# Patient Record
Sex: MALE | Race: WHITE | Employment: FULL TIME | URBAN - METROPOLITAN AREA
[De-identification: names, ages, dates, MRNs, and addresses within clinical notes are randomized per-mention and may not be internally consistent; named-entity substitution may affect disease eponyms.]

---

## 2022-04-07 ENCOUNTER — OFFICE VISIT (OUTPATIENT)
Dept: FAMILY MEDICINE | Facility: CLINIC | Age: 42
End: 2022-04-07
Payer: COMMERCIAL

## 2022-04-07 VITALS
HEART RATE: 53 BPM | SYSTOLIC BLOOD PRESSURE: 130 MMHG | TEMPERATURE: 98.1 F | WEIGHT: 180 LBS | DIASTOLIC BLOOD PRESSURE: 80 MMHG | BODY MASS INDEX: 27.28 KG/M2 | OXYGEN SATURATION: 98 % | RESPIRATION RATE: 16 BRPM | HEIGHT: 68 IN

## 2022-04-07 DIAGNOSIS — M77.8 ELBOW TENDINITIS: ICD-10-CM

## 2022-04-07 DIAGNOSIS — M54.9 UPPER BACK PAIN: ICD-10-CM

## 2022-04-07 DIAGNOSIS — R91.8 PULMONARY NODULES: Primary | ICD-10-CM

## 2022-04-07 PROCEDURE — 99203 OFFICE O/P NEW LOW 30 MIN: CPT | Performed by: FAMILY MEDICINE

## 2022-04-07 RX ORDER — ATORVASTATIN CALCIUM 10 MG/1
10 TABLET, FILM COATED ORAL DAILY
COMMUNITY
Start: 2022-02-18

## 2022-04-07 NOTE — PROGRESS NOTES
"  Assessment & Plan     Pulmonary nodules  CT calcium supplement report reviewed he has 6 mm nodule.  Mostly in the low risk people with non-smoking conservative management is advised, however I advised him to talk to his primary in the next physical and get another CT scan to make sure this remains stable if they agree..  If remains stable then no further work-up is needed.  He agrees and will talk to his primary to follow up    Upper back pain  For back pain most likely muscular suggested ibuprofen, reassured it does not related to any nodule at this time.    Elbow tendinitis  About tenderness most likely overuse.  Suggested Ace wrap and avoiding activities aggravate the pain can take ibuprofen as needed.      BMI:   Estimated body mass index is 27.78 kg/m  as calculated from the following:    Height as of this encounter: 1.715 m (5' 7.5\").    Weight as of this encounter: 81.6 kg (180 lb).           No follow-ups on file.    Garland Carlisle MD  [facility] ELLIE Ramírez is a 41 year old who presents for the following health issues  History of Present Illness       Back Pain:  He presents for follow up of back pain. Patient's back pain is a recurring problem.  Location of back pain:  Right middle of back and right upper back  Description of back pain: cramping  Back pain spreads: nowhere    Since patient first noticed back pain, pain is: gradually improving  Does back pain interfere with his job:  Yes      He eats 2-3 servings of fruits and vegetables daily.He consumes 1 sweetened beverage(s) daily.He exercises with enough effort to increase his heart rate 20 to 29 minutes per day.  He exercises with enough effort to increase his heart rate 4 days per week. He is missing 1 dose(s) of medications per week.     Patient came today with multiple nonspecific complaints of discomfort in the different parts of the body especially in the elbow area also in the upper back in the " "paraspinous area.  He had evaluation done in the past with CT calcium score and he has a small 6 mm nodule he is non-smoker and he has been followed up with his primary about that.  Denies any other concerns no recent weight loss no night sweats or any other symptoms.  These symptoms of upper back pain has improved significantly however he continued to have some elbow discomfort.    Review of Systems   Constitutional, HEENT, cardiovascular, pulmonary, gi and gu systems are negative, except as otherwise noted.      Objective    /80 (BP Location: Left arm, Patient Position: Chair, Cuff Size: Adult Large)   Pulse 53   Temp 98.1  F (36.7  C) (Oral)   Resp 16   Ht 1.715 m (5' 7.5\")   Wt 81.6 kg (180 lb)   SpO2 98%   BMI 27.78 kg/m    Body mass index is 27.78 kg/m .  Physical Exam   GENERAL: healthy, alert and no distress  NECK: no adenopathy, no asymmetry, masses, or scars and thyroid normal to palpation  RESP: lungs clear to auscultation - no rales, rhonchi or wheezes  CV: regular rate and rhythm, normal S1 S2, no S3 or S4, no murmur, click or rub, no peripheral edema and peripheral pulses strong  ABDOMEN: soft, nontender, no hepatosplenomegaly, no masses and bowel sounds normal  MS: no gross musculoskeletal defects noted, no edema  No spinal tenderness.  Certain range of motion the elbow have some discomfort mostly in the medial area.  Denies any swelling in that area.        "